# Patient Record
(demographics unavailable — no encounter records)

---

## 2025-03-10 NOTE — DATA REVIEWED
[de-identified] : 5/22/23: MRI brain stable, no new or enhancing lesions noted. MRI lumbar spine shows no new disc herniation, prior disc herniation is slightly improved 5/10/22: MRI brain with and without contrast: No new enhancing lesions noted 11/2018: MRI brain with and without contrast reveals no new enhancing lesions, stable findings consistent with history of multiple sclerosis.  7/5/16: nonenhancing supratentorial demyelination that has increased in size in the right frontal periventricular region, no new foci of demyelination or enhancements are seen.  2/18/15; periventricular and subcortical white matter hyperintensities compatible with multiple sclerosis, there is a new lesion in the right subcortical white matter is that his no associated abnormal enhancement. MRI cervical spine: Patchy cord signal abnormality compatible with multiple sclerosis plaques, in the lower cervical cord slightly prominent lesion when compared to prior study of 2012,  no abnormal enhancement. MRI thoracic spine: several areas of thoracic cord signal abnormality compatible with multiple sclerosis, there is a small new focus of hyperintensity at T9 level, there is no associated abnormal enhancement.    5/8/2013: MRI of the brain without and with contrast: Periventricular and subcortical white matter hyperintensities compatible with the clinical diagnosis of multiple sclerosis. No new lesions are seen and there is no associated abnormal enhancement. There has been no significant change from 10/2/12.  MRI cervical and thoracic spine with contrast: 10/1/12. Multiple patchy lesions in the cervical cord, multiple central cord lesions T3-4 through T6-7, no abnormal  enhancement suggestive of new lesions.  [de-identified] : 8/19/21:labs CBC, CMP, TSH, A1c, B12 CPK and vitamin D levels are normal. \par  11/13/19: CBC, LFTs normal\par  6/17/19: EMG/NCV studies of lower extremities. This study is abnormal. The electrophysiological findings are consistent with L5/S1 radiculopathy on the left; likely chronic. There is no evidence of peripheral sensory motor neuropathy. Compared with the prior study of 9/25/2012, no significant progression or change is noted.\par  5/3/19: MRI Cervical and thoracic spine: An enhancing lesion at T8, in addition to a lesion at T2 level with enhancement, rest of cervical spine and thoracic spine demonstrate Well-defined patchy discontinuous intramedullary T2 hyperintnsity within the cord without enhancement, and known demyelinating lesions T2-T8 compatible with chronic disease.\par  5/1/19: MRI L spine: disc desiccation / grade 1 spondylolisthesis L5 on S1, stable L3-4 and L4-5 PLH with mild to moderate bilateral foraminal narrowing.\par  9/25/2013: MRI of the lumbar spine with contrast: No abnormal enhancement of the conus.\par  Labs: 4/18/17: CBC, Hepatic panel - nl, AIC 5.8\par  labs: 3/ 3/16; CBC, hepatic panel, vitamin D 25-hydroxy; normal\par  Labs: 2/18/15; CBC, lipid profile, hepatic panel, vitamin D: normal [FreeTextEntry1] : \par  \par  \par

## 2025-03-10 NOTE — DATA REVIEWED
[de-identified] : 5/22/23: MRI brain stable, no new or enhancing lesions noted. MRI lumbar spine shows no new disc herniation, prior disc herniation is slightly improved 5/10/22: MRI brain with and without contrast: No new enhancing lesions noted 11/2018: MRI brain with and without contrast reveals no new enhancing lesions, stable findings consistent with history of multiple sclerosis.  7/5/16: nonenhancing supratentorial demyelination that has increased in size in the right frontal periventricular region, no new foci of demyelination or enhancements are seen.  2/18/15; periventricular and subcortical white matter hyperintensities compatible with multiple sclerosis, there is a new lesion in the right subcortical white matter is that his no associated abnormal enhancement. MRI cervical spine: Patchy cord signal abnormality compatible with multiple sclerosis plaques, in the lower cervical cord slightly prominent lesion when compared to prior study of 2012,  no abnormal enhancement. MRI thoracic spine: several areas of thoracic cord signal abnormality compatible with multiple sclerosis, there is a small new focus of hyperintensity at T9 level, there is no associated abnormal enhancement.    5/8/2013: MRI of the brain without and with contrast: Periventricular and subcortical white matter hyperintensities compatible with the clinical diagnosis of multiple sclerosis. No new lesions are seen and there is no associated abnormal enhancement. There has been no significant change from 10/2/12.  MRI cervical and thoracic spine with contrast: 10/1/12. Multiple patchy lesions in the cervical cord, multiple central cord lesions T3-4 through T6-7, no abnormal  enhancement suggestive of new lesions.  [de-identified] : 8/19/21:labs CBC, CMP, TSH, A1c, B12 CPK and vitamin D levels are normal. \par  11/13/19: CBC, LFTs normal\par  6/17/19: EMG/NCV studies of lower extremities. This study is abnormal. The electrophysiological findings are consistent with L5/S1 radiculopathy on the left; likely chronic. There is no evidence of peripheral sensory motor neuropathy. Compared with the prior study of 9/25/2012, no significant progression or change is noted.\par  5/3/19: MRI Cervical and thoracic spine: An enhancing lesion at T8, in addition to a lesion at T2 level with enhancement, rest of cervical spine and thoracic spine demonstrate Well-defined patchy discontinuous intramedullary T2 hyperintnsity within the cord without enhancement, and known demyelinating lesions T2-T8 compatible with chronic disease.\par  5/1/19: MRI L spine: disc desiccation / grade 1 spondylolisthesis L5 on S1, stable L3-4 and L4-5 PLH with mild to moderate bilateral foraminal narrowing.\par  9/25/2013: MRI of the lumbar spine with contrast: No abnormal enhancement of the conus.\par  Labs: 4/18/17: CBC, Hepatic panel - nl, AIC 5.8\par  labs: 3/ 3/16; CBC, hepatic panel, vitamin D 25-hydroxy; normal\par  Labs: 2/18/15; CBC, lipid profile, hepatic panel, vitamin D: normal [FreeTextEntry1] : \par  \par  \par

## 2025-03-10 NOTE — PHYSICAL EXAM
[General Appearance - Alert] : alert [General Appearance - In No Acute Distress] : in no acute distress [General Appearance - Well-Appearing] : healthy appearing [Oriented To Time, Place, And Person] : oriented to person, place, and time [Mood] : the mood was normal [Impaired Insight] : insight and judgment were intact [Person] : oriented to person [Place] : oriented to place [Time] : oriented to time [Short Term Intact] : short term memory intact [Remote Intact] : remote memory intact [Registration Intact] : recent registration memory intact [Span Intact] : the attention span was normal [Concentration Intact] : normal concentrating ability [Naming Objects] : no difficulty naming common objects [Repeating Phrases] : no difficulty repeating a phrase [Fluency] : fluency intact [Comprehension] : comprehension intact [Current Events] : adequate knowledge of current events [Past History] : adequate knowledge of personal past history [Cranial Nerves Optic (II)] : visual acuity intact bilaterally,  visual fields full to confrontation, pupils equal round and reactive to light [Cranial Nerves Trigeminal (V)] : facial sensation intact symmetrically [Cranial Nerves Facial (VII)] : face symmetrical [Cranial Nerves Vestibulocochlear (VIII)] : hearing was intact bilaterally [Cranial Nerves Glossopharyngeal (IX)] : tongue and palate midline [Cranial Nerves Accessory (XI - Cranial And Spinal)] : head turning and shoulder shrug symmetric [Cranial Nerves Hypoglossal (XII)] : there was no tongue deviation with protrusion [Motor Tone] : muscle tone was normal in all four extremities [No Muscle Atrophy] : normal bulk in all four extremities [Sensation Tactile Decrease] : light touch was intact [Pain / Temp Decrease Lateral Leg & Dorsum Of Foot Left Only] : diminished left lateral leg and dorsum of the foot (L5) [Pain / Temp Decrease Sole Of Foot & Posterior Leg Left Only] : diminished left sole of the foot and posterior leg (S1) [Limited Balance] : the patient's balance was impaired [Coordination - Dysmetria Impaired Finger-to-Nose Left Only] : present on the left side [2+] : Triceps left 2+ [3+] : Ankle jerk left 3+ [Full Visual Field] : full visual field [Hearing Threshold Finger Rub Not [place]] : hearing was normal [Neck Cervical Mass (___cm)] : no neck mass was observed [Involuntary Movements] : no involuntary movements were seen [] : no rash [Past-pointing] : there was no past-pointing [Tremor] : no tremor present [Dysdiadochokinesia Bilaterally] : not present [Plantar Reflex Right Only] : normal on the right [Plantar Reflex Left Only] : normal on the left [___] : absent on the right [FreeTextEntry6] : right upper extremity 5/5, diminished FFM left side; right hip-flexors 5/5, dorsiflexors 5-/5,  left hip-flexors 4-/5, dorsiflexors 4/5, plantar flexors 5-/5. [FreeTextEntry7] : Romberg: Sway without falling [FreeTextEntry8] : Gait: Ataxic, difficulty walking, is using left AFO and cane [Full Pulse] : the pedal pulses are present [Edema] : there was no peripheral edema [FreeTextEntry1] : Left foot fourth toe bluish discoloration, slightly bluish to over distal part of foot

## 2025-03-10 NOTE — REVIEW OF SYSTEMS
[Feeling Tired] : feeling tired [Recent Weight Loss (___ Lbs)] : recent [unfilled] ~Ulb weight loss [Memory Lapses or Loss] : memory loss [Hand Weakness] :  hand weakness [Leg Weakness] : leg weakness [Poor Coordination] : poor coordination [Dizziness] : dizziness [Difficulty Walking] : difficulty walking [Joint Pain] : joint pain [Joint Stiffness] : joint stiffness [Negative] : Heme/Lymph [Recent Weight Gain (___ Lbs)] : no recent weight gain [Confused or Disoriented] : no confusion [Decr. Concentrating Ability] : no decrease in concentrating ability [Difficulty with Language] : no ~M difficulty with language [Changed Thought Patterns] : no change in thought patterns [Repeating Questions] : no repeated questioning about recent events [Facial Weakness] : no facial weakness [Difficulties in Speech] : no speech difficulties [Numbness] : numbness [Tingling] : tingling [Abnormal Sensation] : no abnormal sensation [Seizures] : no convulsions [Vertigo] : no vertigo [Tension Headache] : no tension-type headache [Limping] : not limping [Lower Ext Edema] : no lower extremity edema [Heartburn] : no heartburn [Incontinence] : no incontinence [As Noted in HPI] : as noted in HPI [FreeTextEntry2] : no energy [FreeTextEntry9] : right hip pain, shoulder pain - improved [de-identified] : Left foot fourth toe bluish discoloration, slightly bluish to over distal part of foot

## 2025-03-10 NOTE — REVIEW OF SYSTEMS
[Feeling Tired] : feeling tired [Recent Weight Loss (___ Lbs)] : recent [unfilled] ~Ulb weight loss [Memory Lapses or Loss] : memory loss [Hand Weakness] :  hand weakness [Leg Weakness] : leg weakness [Poor Coordination] : poor coordination [Dizziness] : dizziness [Difficulty Walking] : difficulty walking [Joint Pain] : joint pain [Joint Stiffness] : joint stiffness [Negative] : Heme/Lymph [Recent Weight Gain (___ Lbs)] : no recent weight gain [Confused or Disoriented] : no confusion [Decr. Concentrating Ability] : no decrease in concentrating ability [Difficulty with Language] : no ~M difficulty with language [Changed Thought Patterns] : no change in thought patterns [Repeating Questions] : no repeated questioning about recent events [Facial Weakness] : no facial weakness [Difficulties in Speech] : no speech difficulties [Numbness] : numbness [Tingling] : tingling [Abnormal Sensation] : no abnormal sensation [Seizures] : no convulsions [Vertigo] : no vertigo [Tension Headache] : no tension-type headache [Limping] : not limping [Lower Ext Edema] : no lower extremity edema [Heartburn] : no heartburn [Incontinence] : no incontinence [As Noted in HPI] : as noted in HPI [FreeTextEntry2] : no energy [FreeTextEntry9] : right hip pain, shoulder pain - improved [de-identified] : Left foot fourth toe bluish discoloration, slightly bluish to over distal part of foot

## 2025-03-10 NOTE — REASON FOR VISIT
[Follow-Up: _____] : a [unfilled] follow-up visit [Spouse] : spouse [FreeTextEntry1] :  MS / fatigue / dizziness/Tingling numbness and bluish discoloration left foot

## 2025-03-10 NOTE — HISTORY OF PRESENT ILLNESS
[FreeTextEntry1] : Patient is here for a follow up visit today, was last seen on 7/3/24. Patient reports that she has been going to the gym, exercises regularly, also goes for massage therapy, Has lost weight, overall feels good, she continues to take Tecfidera same dose she has however lowered sertraline to 50 Mg daily. Denies visual blurring, double vision, speech disturbance she has noted remarkable improvement of dizziness  Patient brings to my attention, that she continues to have issues with left leg, at times the left leg swings out, she has been using the AFO regularly, more recently her  reports that she has developed what she calls as tingling numbness sensation and every now and then left foot feels cold - has slightly bluish in hue.  She has not noted new weakness and has not had any falls

## 2025-03-10 NOTE — PHYSICAL EXAM
[General Appearance - Alert] : alert [General Appearance - In No Acute Distress] : in no acute distress [General Appearance - Well-Appearing] : healthy appearing [Oriented To Time, Place, And Person] : oriented to person, place, and time [Impaired Insight] : insight and judgment were intact [Mood] : the mood was normal [Person] : oriented to person [Place] : oriented to place [Time] : oriented to time [Short Term Intact] : short term memory intact [Remote Intact] : remote memory intact [Span Intact] : the attention span was normal [Registration Intact] : recent registration memory intact [Concentration Intact] : normal concentrating ability [Naming Objects] : no difficulty naming common objects [Repeating Phrases] : no difficulty repeating a phrase [Fluency] : fluency intact [Comprehension] : comprehension intact [Current Events] : adequate knowledge of current events [Past History] : adequate knowledge of personal past history [Cranial Nerves Optic (II)] : visual acuity intact bilaterally,  visual fields full to confrontation, pupils equal round and reactive to light [Cranial Nerves Trigeminal (V)] : facial sensation intact symmetrically [Cranial Nerves Facial (VII)] : face symmetrical [Cranial Nerves Vestibulocochlear (VIII)] : hearing was intact bilaterally [Cranial Nerves Glossopharyngeal (IX)] : tongue and palate midline [Cranial Nerves Accessory (XI - Cranial And Spinal)] : head turning and shoulder shrug symmetric [Cranial Nerves Hypoglossal (XII)] : there was no tongue deviation with protrusion [Motor Tone] : muscle tone was normal in all four extremities [No Muscle Atrophy] : normal bulk in all four extremities [Sensation Tactile Decrease] : light touch was intact [Pain / Temp Decrease Lateral Leg & Dorsum Of Foot Left Only] : diminished left lateral leg and dorsum of the foot (L5) [Pain / Temp Decrease Sole Of Foot & Posterior Leg Left Only] : diminished left sole of the foot and posterior leg (S1) [Limited Balance] : the patient's balance was impaired [Coordination - Dysmetria Impaired Finger-to-Nose Left Only] : present on the left side [2+] : Triceps left 2+ [3+] : Ankle jerk left 3+ [Full Visual Field] : full visual field [Hearing Threshold Finger Rub Not [place]] : hearing was normal [Neck Cervical Mass (___cm)] : no neck mass was observed [Involuntary Movements] : no involuntary movements were seen [] : no rash [Past-pointing] : there was no past-pointing [Tremor] : no tremor present [Dysdiadochokinesia Bilaterally] : not present [Plantar Reflex Right Only] : normal on the right [Plantar Reflex Left Only] : normal on the left [___] : absent on the right [FreeTextEntry6] : right upper extremity 5/5, diminished FFM left side; right hip-flexors 5/5, dorsiflexors 5-/5,  left hip-flexors 4-/5, dorsiflexors 4/5, plantar flexors 5-/5. [FreeTextEntry7] : Romberg: Sway without falling [FreeTextEntry8] : Gait: Ataxic, difficulty walking, is using left AFO and cane [Full Pulse] : the pedal pulses are present [Edema] : there was no peripheral edema [FreeTextEntry1] : Left foot fourth toe bluish discoloration, slightly bluish to over distal part of foot

## 2025-03-10 NOTE — DISCUSSION/SUMMARY
[FreeTextEntry1] : 54-year-old female with history of L-S DJD and multiple sclerosis  #1) Multiple sclerosis relapsing remitting type; last prior relapse - L EVELIO 8/2016, 2 new enhancing thoracic spine lesion on MR 5/2019, stable since then, MRI brain scan 5/2023 shows no new or enhancing lesions.  - Continue Tecfidera, 240 mg b.i.d.,   - Vitamin D3 2000 international units daily, - Bone density testing   #2) Left foot skin changes with paresthesias, dorsalis pedis pulse present, rule out any vascular insufficiency  - Have recommended seeing  vascular surgery ASAP - EMG/NCV rule out peroneal neuropathy  # Left leg weakness and left leg spasms/weakness, No evidence of worsening disc herniation on lumbar MRI  #4)  Fatigue / anxiety - multiple sclerosis;   -Continue Zoloft 50 Mg q.d.

## 2025-04-28 NOTE — PHYSICAL EXAM
[JVD] : no jugular venous distention  [Normal Breath Sounds] : Normal breath sounds [Normal Rate and Rhythm] : normal rate and rhythm [2+] : left 2+ [de-identified] : well appearing [de-identified] : wnl [FreeTextEntry1] : LLE: venous congestion; pitting edema +1

## 2025-04-28 NOTE — PHYSICAL EXAM
[JVD] : no jugular venous distention  [Normal Breath Sounds] : Normal breath sounds [Normal Rate and Rhythm] : normal rate and rhythm [2+] : left 2+ [de-identified] : well appearing [de-identified] : wnl [FreeTextEntry1] : LLE: venous congestion; pitting edema +1

## 2025-04-28 NOTE — ASSESSMENT
[FreeTextEntry1] : 55yo female with MS and chronic LLE weakness (uses ankle brace) here with purple discoloration, swelling and aching in the LLE; on duplex has >5s GSV reflux likely contributing to her symptoms -discussed that she is a candidate for GSV RFA but given she's not tried compression would do a trial of that first  -20-30mmHg compression stockings to be worn daily; prescription given and explained proper wear and use -calf muscle exercises and leg elevation when possible -Monitor for unilateral swelling, redness and pain if any of these symptoms occur recommend calling office immediately as these are signs of DVT/SVT  follow up 2 months